# Patient Record
Sex: FEMALE | URBAN - METROPOLITAN AREA
[De-identification: names, ages, dates, MRNs, and addresses within clinical notes are randomized per-mention and may not be internally consistent; named-entity substitution may affect disease eponyms.]

---

## 2019-01-04 ENCOUNTER — TELEPHONE (OUTPATIENT)
Dept: FAMILY MEDICINE CLINIC | Facility: CLINIC | Age: 34
End: 2019-01-04

## 2019-01-04 NOTE — TELEPHONE ENCOUNTER
Patient called she would like to know when and if she had hepatitis shot done pt # [phone]   TY

## 2019-01-04 NOTE — TELEPHONE ENCOUNTER
I left a message saying that we do not have records of her shots, but then I looked in the old chart and yes I found her shot records she did have 3 hep b shots